# Patient Record
Sex: MALE | Race: WHITE | NOT HISPANIC OR LATINO | Employment: FULL TIME | URBAN - METROPOLITAN AREA
[De-identification: names, ages, dates, MRNs, and addresses within clinical notes are randomized per-mention and may not be internally consistent; named-entity substitution may affect disease eponyms.]

---

## 2017-01-23 RX ORDER — AMLODIPINE BESYLATE 5 MG/1
TABLET ORAL
Qty: 30 TABLET | Refills: 10 | Status: SHIPPED | OUTPATIENT
Start: 2017-01-23 | End: 2017-05-25

## 2017-03-18 RX ORDER — FUROSEMIDE 20 MG/1
TABLET ORAL
Qty: 30 TABLET | Refills: 10 | OUTPATIENT
Start: 2017-03-18

## 2017-04-18 ENCOUNTER — PATIENT MESSAGE (OUTPATIENT)
Dept: NEPHROLOGY | Facility: CLINIC | Age: 62
End: 2017-04-18

## 2017-04-18 DIAGNOSIS — N18.30 CKD (CHRONIC KIDNEY DISEASE) STAGE 3, GFR 30-59 ML/MIN: Primary | ICD-10-CM

## 2017-04-21 ENCOUNTER — PATIENT MESSAGE (OUTPATIENT)
Dept: ADMINISTRATIVE | Facility: OTHER | Age: 62
End: 2017-04-21

## 2017-05-18 ENCOUNTER — LAB VISIT (OUTPATIENT)
Dept: LAB | Facility: HOSPITAL | Age: 62
End: 2017-05-18
Attending: INTERNAL MEDICINE
Payer: COMMERCIAL

## 2017-05-18 DIAGNOSIS — N18.30 CKD (CHRONIC KIDNEY DISEASE) STAGE 3, GFR 30-59 ML/MIN: ICD-10-CM

## 2017-05-18 LAB
ANION GAP SERPL CALC-SCNC: 9 MMOL/L
BASOPHILS # BLD AUTO: 0.06 K/UL
BASOPHILS NFR BLD: 0.7 %
BUN SERPL-MCNC: 40 MG/DL
CALCIUM SERPL-MCNC: 9.1 MG/DL
CHLORIDE SERPL-SCNC: 105 MMOL/L
CO2 SERPL-SCNC: 28 MMOL/L
CREAT SERPL-MCNC: 1.8 MG/DL
DIFFERENTIAL METHOD: ABNORMAL
EOSINOPHIL # BLD AUTO: 0.2 K/UL
EOSINOPHIL NFR BLD: 2.4 %
ERYTHROCYTE [DISTWIDTH] IN BLOOD BY AUTOMATED COUNT: 15 %
EST. GFR  (AFRICAN AMERICAN): 45.3 ML/MIN/1.73 M^2
EST. GFR  (NON AFRICAN AMERICAN): 39.2 ML/MIN/1.73 M^2
GLUCOSE SERPL-MCNC: 79 MG/DL
HCT VFR BLD AUTO: 40.9 %
HGB BLD-MCNC: 13.7 G/DL
LYMPHOCYTES # BLD AUTO: 1.7 K/UL
LYMPHOCYTES NFR BLD: 18.6 %
MCH RBC QN AUTO: 31.1 PG
MCHC RBC AUTO-ENTMCNC: 33.5 %
MCV RBC AUTO: 93 FL
MONOCYTES # BLD AUTO: 0.6 K/UL
MONOCYTES NFR BLD: 6.5 %
NEUTROPHILS # BLD AUTO: 6.6 K/UL
NEUTROPHILS NFR BLD: 71.6 %
PLATELET # BLD AUTO: 161 K/UL
PMV BLD AUTO: 10.2 FL
POTASSIUM SERPL-SCNC: 4.3 MMOL/L
RBC # BLD AUTO: 4.4 M/UL
SODIUM SERPL-SCNC: 142 MMOL/L
WBC # BLD AUTO: 9.18 K/UL

## 2017-05-18 PROCEDURE — 80048 BASIC METABOLIC PNL TOTAL CA: CPT

## 2017-05-18 PROCEDURE — 36415 COLL VENOUS BLD VENIPUNCTURE: CPT | Mod: PO

## 2017-05-18 PROCEDURE — 85025 COMPLETE CBC W/AUTO DIFF WBC: CPT

## 2017-05-25 ENCOUNTER — OFFICE VISIT (OUTPATIENT)
Dept: NEPHROLOGY | Facility: CLINIC | Age: 62
End: 2017-05-25
Payer: COMMERCIAL

## 2017-05-25 VITALS
HEIGHT: 68 IN | BODY MASS INDEX: 28.07 KG/M2 | HEART RATE: 95 BPM | SYSTOLIC BLOOD PRESSURE: 110 MMHG | WEIGHT: 185.19 LBS | DIASTOLIC BLOOD PRESSURE: 70 MMHG

## 2017-05-25 DIAGNOSIS — N13.8 OBSTRUCTIVE NEPHROPATHY: ICD-10-CM

## 2017-05-25 DIAGNOSIS — N18.30 CHRONIC KIDNEY DISEASE, STAGE III (MODERATE): Primary | ICD-10-CM

## 2017-05-25 DIAGNOSIS — I48.0 PAROXYSMAL ATRIAL FIBRILLATION: ICD-10-CM

## 2017-05-25 DIAGNOSIS — I50.33 ACUTE ON CHRONIC DIASTOLIC CONGESTIVE HEART FAILURE: ICD-10-CM

## 2017-05-25 DIAGNOSIS — R60.0 LOCALIZED EDEMA: ICD-10-CM

## 2017-05-25 DIAGNOSIS — I10 ESSENTIAL HYPERTENSION: ICD-10-CM

## 2017-05-25 PROBLEM — I48.91 ATRIAL FIBRILLATION: Status: ACTIVE | Noted: 2017-05-25

## 2017-05-25 PROCEDURE — 99215 OFFICE O/P EST HI 40 MIN: CPT | Mod: S$GLB,,, | Performed by: INTERNAL MEDICINE

## 2017-05-25 PROCEDURE — 99999 PR PBB SHADOW E&M-EST. PATIENT-LVL III: CPT | Mod: PBBFAC,,, | Performed by: INTERNAL MEDICINE

## 2017-05-25 RX ORDER — WARFARIN SODIUM 5 MG/1
5 TABLET ORAL
COMMUNITY

## 2017-05-25 RX ORDER — FUROSEMIDE 40 MG/1
40 TABLET ORAL 2 TIMES DAILY
Qty: 60 TABLET | Refills: 6
Start: 2017-05-25

## 2017-05-25 RX ORDER — ATORVASTATIN CALCIUM 40 MG/1
40 TABLET, FILM COATED ORAL
COMMUNITY

## 2017-05-25 RX ORDER — METOPROLOL SUCCINATE 50 MG/1
50 TABLET, EXTENDED RELEASE ORAL
COMMUNITY

## 2017-05-25 RX ORDER — LISINOPRIL 2.5 MG/1
2.5 TABLET ORAL
COMMUNITY

## 2017-05-25 RX ORDER — POTASSIUM CHLORIDE 20 MEQ/1
20 TABLET, EXTENDED RELEASE ORAL
COMMUNITY

## 2017-05-25 NOTE — PROGRESS NOTES
NEPHROLOGY CLINIC FOLLOWUP NOTE    REASON FOR FOLLOWUP AND CHIEF COMPLAINT:  Chronic kidney disease.    Cardiologist is Dr. Daniel Beauchamp.  His phone # is [phone].  He is from   [facility].    HISTORY OF PRESENT ILLNESS:  Mr. Ravi is a 61-year-old  male who has   followed with us at [facility] for the past several years.  He has a   history of chronic kidney disease and renal insufficiency due to benign   prostatic hypertrophy with prior obstruction.  He previously had bilateral   hydronephrosis.  His renal failure stabilized after treatment of prostate   hypertrophy.  In the past several years, his creatinine has remained stable and   not worsened.  He was last seen by me in August 2015.  He has no acute   complaints today.  No chest pain.  No shortness of breath.  No difficulty   urinating.  No back pain.  No abdominal pain.  No fever.  Labs and meds were   reviewed with him.    Since his last visit, he has had some cardiac issues, which I have reviewed   today.  He is now status post pacemaker and ICD placement for development of   atrial fibrillation.  He is also on Coumadin and his medications have changed.    I have noted that he is on Lasix 80 mg twice a day after a recent episode of   acute CHF exacerbation.  He says he is doing well today.  He says his leg   swelling has markedly improved.  He is not short of breath.  His serum   creatinine has slightly worsened from his baseline.    PAST MEDICAL HISTORY:  1.  CKD stage III, estimated GFR about 50 mL per minute.  2.  Hypertension.  3.  UTI.  4.  Prostatitis.  5.  Benign prostatic hypertrophy with obstruction and hydronephrosis bilaterally   status post laser treatment.  6.  Multiple DVTs.  7.  History of cholecystectomy.  8.  Coronary artery disease.  9.  Mitral regurgitation  10.  Atrial fibrillation.  11.  Congestive heart failure status post pacemaker and ICD placement.    CARDIOLOGIST:  Daniel Beauchamp from  [facility].    PAST SURGICAL HISTORY:  As above.    FAMILY MEDICAL HISTORY:  Unchanged.    ALLERGIES:  Reviewed.  No known drug allergies.    MEDICATIONS:  Reviewed.  Lisinopril 2.5 mg p.o. daily, Lasix 80 mg b.i.d.,   Lipitor 40 mg daily, aspirin 325 mg, Toprol-XL 50 mg daily, potassium   replacement and Coumadin 5 mg.    REVIEW OF SYSTEMS:  No recent hospitalizations.  GENERAL:  Negative.  HEAD, EYES, EARS, NOSE, AND THROAT:  Negative.  CARDIAC:  Negative.  PULMONARY:  Negative.  GASTROINTESTINAL:  Negative.  GENITOURINARY:  Negative.  PSYCHOLOGICAL:  Negative.  NEUROLOGICAL:  Negative.  ENDOCRINE:  Negative.  HEMATOLOGIC AND ONCOLOGIC:  Negative.  INFECTIOUS DISEASE:  Negative.  The rest of the review of systems negative.    PHYSICAL EXAMINATION:  VITAL SIGNS:  Blood pressure is 110/70, pulse is 95.  His weight is 185 pounds   compared to 201 pounds about a year ago.  GENERAL:  He is cooperative, pleasant.  Ambulating by himself.  He is in no   acute distress.  Speech and thought process appropriate, normal.  HEENT:  Atraumatic, normocephalic:  Mucous membranes moist.  NECK:  No JVD.  HEART:  Regular rate and rhythm, S1 and S2 audible.  No rubs.  CHEST:  Clear to auscultation.  No rales.  No wheezes.  Breathing symmetric,   unlabored.  ABDOMEN:  Soft, nontender.  EXTREMITIES:  Showed trace to 1+ pitting edema bilaterally.    LABORATORY:  Reviewed.  Creatinine is 1.8, sodium 142, potassium 4.3, chloride   105, bicarbonate 28, glucose 79, calcium 9.5, white count 9.1, hemoglobin 13.7,   platelets 161.  Urinalysis shows negative protein, negative blood.  Urine   protein to creatinine ratio is negative 0.    ASSESSMENT AND PLAN:  This is a 61-year-old man with CKD stage III, who presents   for followup.  The patient usually follows with us once a year, but this visit   was longer than his usual.  He has some cardiac issues since his last visit,   which I will review.  The impression is as  follows:  1.  Renal.  The patient's renal function has remained stable in the past several   years.  He was previously in acute renal failure, but renal dysfunction became   stable after relief of the prostate obstruction.  However, it has left some   residual kidney damage behind.  He has baseline CKD stage III.  2.  Labs reviewed.  Potassium and other electrolytes are stable and normal.    Acid base status is stable.  3.  No urinary issues.  No complaints of dysuria.  No active issues status post   relief of prostate obstruction.  4.  Cardiac:  He has had new cardiac issues including atrial fibrillation and   acute exacerbation of CHF.  He has required placement of a pacemaker and ICD.    He is now on Coumadin and he is on a large dose of diuretics.  I have seen and   evaluated the patient today.  I believe he has diuresed enough and Lasix can now   be tapered down.  I have discussed this with the patient.  His lung exam was   unremarkable.  He has significant peripheral edema; however, this appears to   have improved per history obtained.  Lasix would be reduced.  5.  Hypertension.  Blood pressure is controlled to low.  Medications reviewed.    The patient is not symptomatic, we will monitor.    PLANS AND RECOMMENDATIONS:  1.  Discussed with the patient.  Opportunity for questions and discussion   provided.  2.  Decrease Lasix to 40 mg p.o. b.i.d. from 80 mg b.i.d.  3.  Advised the patient to continue low-salt diet as well as low to moderate   fluid intake.  Do not drink water or other fluids more than 1-2 liters a day.    Total time spent 40 minutes including time needed to review the records as there   was new issues that needed to be addressed.  The patient evaluation,   documentation and face-to-face discussion with the patient.  More than 50% of   the time was spent in counseling and coordination of care.  Level V visit.    Return to see us in about three months for close followup.            / [id number]  stiven(s)        FLAQUITO  dd: 05/25/2017 15:13:11 (CDT)  td: 05/26/2017 13:20:24 (CDT)  Doc ID   #[id number]  Job ID #[id number]    CC: Daniel Beauchamp M.D.    [id number]

## 2017-08-29 ENCOUNTER — LAB VISIT (OUTPATIENT)
Dept: LAB | Facility: HOSPITAL | Age: 62
End: 2017-08-29
Attending: INTERNAL MEDICINE
Payer: COMMERCIAL

## 2017-08-29 DIAGNOSIS — N18.30 CHRONIC KIDNEY DISEASE, STAGE III (MODERATE): ICD-10-CM

## 2017-08-29 LAB
ANION GAP SERPL CALC-SCNC: 14 MMOL/L
BUN SERPL-MCNC: 33 MG/DL
CALCIUM SERPL-MCNC: 9.2 MG/DL
CHLORIDE SERPL-SCNC: 103 MMOL/L
CO2 SERPL-SCNC: 24 MMOL/L
CREAT SERPL-MCNC: 1.7 MG/DL
EST. GFR  (AFRICAN AMERICAN): 48.2 ML/MIN/1.73 M^2
EST. GFR  (NON AFRICAN AMERICAN): 41.7 ML/MIN/1.73 M^2
GLUCOSE SERPL-MCNC: 79 MG/DL
POTASSIUM SERPL-SCNC: 4.3 MMOL/L
SODIUM SERPL-SCNC: 141 MMOL/L

## 2017-08-29 PROCEDURE — 80048 BASIC METABOLIC PNL TOTAL CA: CPT

## 2017-08-29 PROCEDURE — 36415 COLL VENOUS BLD VENIPUNCTURE: CPT | Mod: PO

## 2017-09-08 ENCOUNTER — OFFICE VISIT (OUTPATIENT)
Dept: NEPHROLOGY | Facility: CLINIC | Age: 62
End: 2017-09-08
Payer: COMMERCIAL

## 2017-09-08 VITALS
HEIGHT: 68 IN | HEART RATE: 80 BPM | DIASTOLIC BLOOD PRESSURE: 82 MMHG | SYSTOLIC BLOOD PRESSURE: 122 MMHG | BODY MASS INDEX: 29.17 KG/M2 | WEIGHT: 192.44 LBS

## 2017-09-08 DIAGNOSIS — N40.0 BENIGN PROSTATIC HYPERPLASIA WITHOUT LOWER URINARY TRACT SYMPTOMS: ICD-10-CM

## 2017-09-08 DIAGNOSIS — I48.19 PERSISTENT ATRIAL FIBRILLATION: ICD-10-CM

## 2017-09-08 DIAGNOSIS — N18.30 CHRONIC KIDNEY DISEASE, STAGE III (MODERATE): Primary | ICD-10-CM

## 2017-09-08 DIAGNOSIS — I10 ESSENTIAL HYPERTENSION: ICD-10-CM

## 2017-09-08 PROCEDURE — 3079F DIAST BP 80-89 MM HG: CPT | Mod: S$GLB,,, | Performed by: INTERNAL MEDICINE

## 2017-09-08 PROCEDURE — 3008F BODY MASS INDEX DOCD: CPT | Mod: S$GLB,,, | Performed by: INTERNAL MEDICINE

## 2017-09-08 PROCEDURE — 99214 OFFICE O/P EST MOD 30 MIN: CPT | Mod: S$GLB,,, | Performed by: INTERNAL MEDICINE

## 2017-09-08 PROCEDURE — 99999 PR PBB SHADOW E&M-EST. PATIENT-LVL III: CPT | Mod: PBBFAC,,, | Performed by: INTERNAL MEDICINE

## 2017-09-08 PROCEDURE — 3074F SYST BP LT 130 MM HG: CPT | Mod: S$GLB,,, | Performed by: INTERNAL MEDICINE

## 2017-09-08 NOTE — PROGRESS NOTES
NEPHROLOGY CLINIC FOLLOWUP NOTE    CARDIOLOGIST:  Daniel Beauchamp M.D.  Dr. Beauchamp's phone # is [phone] from   [facility].    HISTORY OF PRESENT ILLNESS:  Mr. Ravi is a 62-year-old  male who has   a history of CKD, stage III and hypertension, and normally follows with us once   a year.  He was last seen by us about four months ago.  As previously   documented, he recently had developed some additional cardiac issues related to   atrial fibrillation.  He is status post pacemaker and ICD placement.  He is on   Coumadin and aspirin.  The patient also had acute CHF exacerbation and had been   placed on Lasix, the dose of which we reduced from 80 mg twice a day to 40 mg   twice a day.    He presents for followup.  He tells me that Dr. Beauchamp is planning   cardioversion for atrial fibrillation.  He has no acute complaints today.  No   chest pain, no shortness of breath, no dizziness, no worsening in leg swelling.    He is tolerating and walking well.  He is on the same dose of Lasix that was   mentioned above.  He is watching his diet carefully, he is on low-salt and   moderate fluid intake.    Regarding his renal issues, he developed renal insufficiency due to complication   of benign prostatic hypertrophy with obstruction.  He had bilateral   hydronephrosis.  Once the prostate hypertrophy was addressed surgically, his   renal failure stabilized.  He has had well stable serum creatinine in the past   several years with a baseline of about 1.7.  He has no urinary complaints today.    PAST MEDICAL HISTORY:  1.  CKD stage III, estimated GFR about 50 mL per minute.  2.  Hypertension.  3.  UTI.  4.  Prostatitis.  5.  Benign prostatic hypertrophy with obstruction and hydronephrosis bilaterally   status post laser treatment.  6.  Atrial fibrillation status post pacemaker and ICD placement.  7.  Diastolic congestive heart failure.  8.  History of DVTs.  9.  Cholecystectomy.  10.  Coronary  artery disease.  11.  Mitral jet regurgitation.    PAST SURGICAL HISTORY:  Reviewed as above.    FAMILY HISTORY:  Reviewed and unchanged.    ALLERGIES:  Reviewed.  No known drug allergies.    MEDICATIONS:  Reviewed.  Aspirin 325 mg p.o. daily, Lipitor 40 mg daily, Lasix   is 40 mg b.i.d., lisinopril 2.5 mg daily, Toprol-XL 50 mg daily, potassium   replacement 20 mEq, and Coumadin 5 mg.    REVIEW OF SYSTEMS:  No recent hospitalizations.  GENERAL:  Negative.  HEAD, EYES, EARS, NOSE, THROAT:  Negative.  CARDIAC:  Negative.  PULMONARY:  Negative.  GASTROINTESTINAL:  Negative.  GENITOURINARY:  Negative.  PSYCHOLOGICAL:  Negative.  NEUROLOGICAL:  Negative.  ENDOCRINE:  Negative.  HEMATOLOGIC AND ONCOLOGIC:  Negative.  INFECTIOUS DISEASE:  Negative.  The rest of the review of systems negative.    PHYSICAL EXAMINATION:  VITAL SIGNS:  Blood pressure is 122/82, pulse 80, weight is 192 pounds.  GENERAL:  He is cooperative, pleasant, in no acute distress.  Mucous membranes   moist.  Ambulating by himself.  Speech and thought process appropriate and   normal.  HEART:  Irregularly irregular.  CHEST:  Clear to auscultation.  No rales.  No wheezes.  Breathing symmetric,   unlabored.  ABDOMEN:  Soft, nontender.  EXTREMITIES:  Showed only trace edema bilaterally.    LABORATORY:  Reviewed.  Creatinine is 1.7, sodium 141, potassium 4.3, chloride   103, bicarbonate 24, calcium 9.2.    Labs were further reviewed.  TSH from the [facility] was 1.2 in December 2016.    ASSESSMENT AND PLAN:  This is a 62-year-old man with CKD stage III, who presents   for followup.  The impression is as follows:  1.  Renal.  The patient's renal function is stable.  Creatinine has not changed,   has not worsened.  The patient is doing well from our point of view, potassium   is normal.  Acid base status is stable.  He is doing well from our point of   view.  2.  History of BPH with obstruction, status post laser surgery.  He is doing   well, stable,  obstruction was relieved.  Renal failure is stable.  3.  Cardiac.  He has atrial fibrillation and had acute exacerbation of CHF,   which has now resolved, uncontrolled.  His fluid status is stable.  He is on the   right dose of Lasix.  The patient is applying salt restriction and fluid   moderation to his diet.    Regarding atrial fibrillation and is on Coumadin and aspirin.  He is status post   ICD placement and the patient is following with Dr. Beauchamp, his cardiologist   for possible cardioversion.  The patient was reassured that the procedure is   safe and we will defer further management to his cardiologist.    PLANS AND RECOMMENDATIONS:  As discussed above.  Opportunity for questions and   discussion provided.  No medication changes today.  Continue low-salt diet and   low-to-moderate fluid intake.  Total time spent 25 minutes, more than 50% of the   time was spent on counseling and coordination of care.  Return to see us in one   year or sooner if necessary.      ADRIANA  dd: 09/08/2017 15:47:11 (CDT)  td: 09/09/2017 02:07:04 (CDT)  Doc ID   #[id number]  Job ID #[id number]    CC: Daniel Beauchamp M.D.    [id number]

## 2023-05-17 LAB — HEMOCCULT STL QL IA: NEGATIVE

## 2023-07-21 ENCOUNTER — PATIENT OUTREACH (OUTPATIENT)
Dept: ADMINISTRATIVE | Facility: HOSPITAL | Age: 68
End: 2023-07-21
Payer: COMMERCIAL

## 2023-07-22 ENCOUNTER — PATIENT MESSAGE (OUTPATIENT)
Dept: ADMINISTRATIVE | Facility: HOSPITAL | Age: 68
End: 2023-07-22
Payer: COMMERCIAL

## 2023-07-22 ENCOUNTER — TELEPHONE (OUTPATIENT)
Dept: ADMINISTRATIVE | Facility: HOSPITAL | Age: 68
End: 2023-07-22
Payer: COMMERCIAL

## 2023-08-03 ENCOUNTER — PATIENT OUTREACH (OUTPATIENT)
Dept: ADMINISTRATIVE | Facility: HOSPITAL | Age: 68
End: 2023-08-03
Payer: COMMERCIAL

## 2023-10-13 ENCOUNTER — PATIENT OUTREACH (OUTPATIENT)
Dept: ADMINISTRATIVE | Facility: HOSPITAL | Age: 68
End: 2023-10-13
Payer: COMMERCIAL

## 2024-03-28 ENCOUNTER — LAB VISIT (OUTPATIENT)
Dept: LAB | Facility: HOSPITAL | Age: 69
End: 2024-03-28
Attending: INTERNAL MEDICINE
Payer: MEDICARE

## 2024-03-28 DIAGNOSIS — N18.30 STAGE 3 CHRONIC KIDNEY DISEASE, UNSPECIFIED WHETHER STAGE 3A OR 3B CKD: Primary | ICD-10-CM

## 2024-03-28 DIAGNOSIS — N18.30 STAGE 3 CHRONIC KIDNEY DISEASE, UNSPECIFIED WHETHER STAGE 3A OR 3B CKD: ICD-10-CM

## 2024-03-28 LAB
BASOPHILS # BLD AUTO: 0.09 K/UL (ref 0–0.2)
BASOPHILS NFR BLD: 1 % (ref 0–1.9)
DIFFERENTIAL METHOD BLD: ABNORMAL
EOSINOPHIL # BLD AUTO: 0.2 K/UL (ref 0–0.5)
EOSINOPHIL NFR BLD: 1.9 % (ref 0–8)
ERYTHROCYTE [DISTWIDTH] IN BLOOD BY AUTOMATED COUNT: 13.4 % (ref 11.5–14.5)
HCT VFR BLD AUTO: 43.6 % (ref 40–54)
HGB BLD-MCNC: 15 G/DL (ref 14–18)
IMM GRANULOCYTES # BLD AUTO: 0.03 K/UL (ref 0–0.04)
IMM GRANULOCYTES NFR BLD AUTO: 0.3 % (ref 0–0.5)
LYMPHOCYTES # BLD AUTO: 1.4 K/UL (ref 1–4.8)
LYMPHOCYTES NFR BLD: 14.9 % (ref 18–48)
MCH RBC QN AUTO: 31.9 PG (ref 27–31)
MCHC RBC AUTO-ENTMCNC: 34.4 G/DL (ref 32–36)
MCV RBC AUTO: 93 FL (ref 82–98)
MONOCYTES # BLD AUTO: 0.6 K/UL (ref 0.3–1)
MONOCYTES NFR BLD: 6.7 % (ref 4–15)
NEUTROPHILS # BLD AUTO: 7.1 K/UL (ref 1.8–7.7)
NEUTROPHILS NFR BLD: 75.2 % (ref 38–73)
NRBC BLD-RTO: 0 /100 WBC
PLATELET # BLD AUTO: 195 K/UL (ref 150–450)
PMV BLD AUTO: 10.7 FL (ref 9.2–12.9)
RBC # BLD AUTO: 4.7 M/UL (ref 4.6–6.2)
WBC # BLD AUTO: 9.41 K/UL (ref 3.9–12.7)

## 2024-03-28 PROCEDURE — 80048 BASIC METABOLIC PNL TOTAL CA: CPT | Mod: HCNC | Performed by: INTERNAL MEDICINE

## 2024-03-28 PROCEDURE — 36415 COLL VENOUS BLD VENIPUNCTURE: CPT | Mod: HCNC,PN | Performed by: INTERNAL MEDICINE

## 2024-03-28 PROCEDURE — 85025 COMPLETE CBC W/AUTO DIFF WBC: CPT | Mod: HCNC | Performed by: INTERNAL MEDICINE

## 2024-03-29 LAB
ANION GAP SERPL CALC-SCNC: 7 MMOL/L (ref 8–16)
BUN SERPL-MCNC: 20 MG/DL (ref 8–23)
CALCIUM SERPL-MCNC: 9.8 MG/DL (ref 8.7–10.5)
CHLORIDE SERPL-SCNC: 109 MMOL/L (ref 95–110)
CO2 SERPL-SCNC: 25 MMOL/L (ref 23–29)
CREAT SERPL-MCNC: 1.2 MG/DL (ref 0.5–1.4)
EST. GFR  (NO RACE VARIABLE): >60 ML/MIN/1.73 M^2
GLUCOSE SERPL-MCNC: 101 MG/DL (ref 70–110)
POTASSIUM SERPL-SCNC: 4.3 MMOL/L (ref 3.5–5.1)
SODIUM SERPL-SCNC: 141 MMOL/L (ref 136–145)

## 2024-04-02 ENCOUNTER — OFFICE VISIT (OUTPATIENT)
Dept: NEPHROLOGY | Facility: CLINIC | Age: 69
End: 2024-04-02
Payer: MEDICARE

## 2024-04-02 VITALS
RESPIRATION RATE: 18 BRPM | WEIGHT: 190.25 LBS | BODY MASS INDEX: 28.83 KG/M2 | SYSTOLIC BLOOD PRESSURE: 158 MMHG | DIASTOLIC BLOOD PRESSURE: 78 MMHG | HEIGHT: 68 IN | HEART RATE: 63 BPM

## 2024-04-02 DIAGNOSIS — I50.33 ACUTE ON CHRONIC DIASTOLIC CONGESTIVE HEART FAILURE: ICD-10-CM

## 2024-04-02 DIAGNOSIS — I10 PRIMARY HYPERTENSION: Primary | ICD-10-CM

## 2024-04-02 PROCEDURE — 99999 PR PBB SHADOW E&M-EST. PATIENT-LVL III: CPT | Mod: PBBFAC,HCNC,, | Performed by: INTERNAL MEDICINE

## 2024-04-02 PROCEDURE — 3078F DIAST BP <80 MM HG: CPT | Mod: HCNC,CPTII,S$GLB, | Performed by: INTERNAL MEDICINE

## 2024-04-02 PROCEDURE — 99205 OFFICE O/P NEW HI 60 MIN: CPT | Mod: HCNC,S$GLB,, | Performed by: INTERNAL MEDICINE

## 2024-04-02 PROCEDURE — 1159F MED LIST DOCD IN RCRD: CPT | Mod: HCNC,CPTII,S$GLB, | Performed by: INTERNAL MEDICINE

## 2024-04-02 PROCEDURE — 1126F AMNT PAIN NOTED NONE PRSNT: CPT | Mod: HCNC,CPTII,S$GLB, | Performed by: INTERNAL MEDICINE

## 2024-04-02 PROCEDURE — 4010F ACE/ARB THERAPY RXD/TAKEN: CPT | Mod: HCNC,CPTII,S$GLB, | Performed by: INTERNAL MEDICINE

## 2024-04-02 PROCEDURE — 3008F BODY MASS INDEX DOCD: CPT | Mod: HCNC,CPTII,S$GLB, | Performed by: INTERNAL MEDICINE

## 2024-04-02 PROCEDURE — 1101F PT FALLS ASSESS-DOCD LE1/YR: CPT | Mod: HCNC,CPTII,S$GLB, | Performed by: INTERNAL MEDICINE

## 2024-04-02 PROCEDURE — 3288F FALL RISK ASSESSMENT DOCD: CPT | Mod: HCNC,CPTII,S$GLB, | Performed by: INTERNAL MEDICINE

## 2024-04-02 PROCEDURE — 3066F NEPHROPATHY DOC TX: CPT | Mod: HCNC,CPTII,S$GLB, | Performed by: INTERNAL MEDICINE

## 2024-04-02 PROCEDURE — 3077F SYST BP >= 140 MM HG: CPT | Mod: HCNC,CPTII,S$GLB, | Performed by: INTERNAL MEDICINE

## 2024-04-02 RX ORDER — FUROSEMIDE 20 MG/1
20 TABLET ORAL 2 TIMES DAILY
Qty: 180 TABLET | Refills: 3
Start: 2024-04-02

## 2024-04-02 RX ORDER — GLUCOSAMINE/D3/BOSWELLIA SERRA 1500MG-400
TABLET ORAL 2 TIMES DAILY
COMMUNITY

## 2024-04-02 RX ORDER — AMLODIPINE BESYLATE 5 MG/1
5 TABLET ORAL DAILY
Qty: 90 TABLET | Refills: 3 | Status: SHIPPED | OUTPATIENT
Start: 2024-04-02 | End: 2025-04-02

## 2024-04-02 NOTE — PROGRESS NOTES
NEPHROLOGY CLINIC CONSULT NOTE:  Date of clinic visit: 4/2/24  Reason for consult: lost to f/u. Past kidney failure     CARDIOLOGIST: Dr. Chua     HISTORY OF PRESENT ILLNESS:  Mr. Ravi is a 68-year-old male who had in the past renal failure due to obstructive nephropathy due to BPH.   Post surgical correction of BPH, s Cr improved from 2.8 to 1.7.  S Cr stayed stable for many years. Pt last saw us in 2017. Pt was lost to f/u and he self-referred himself to us.    He feels well, no new issues, no discomfort.     PAST MEDICAL HISTORY:  1.  CKD stage III, estimated GFR about 50 mL per minute.  2.  Hypertension.  3.  UTI.  4.  Prostatitis.  5.  Benign prostatic hypertrophy with obstruction and hydronephrosis bilaterally status post laser treatment.  6.  Atrial fibrillation status post pacemaker and ICD placement. S/p AVN ablation  7.  Systolic and diastolic congestive heart failure.  8.  History of DVTs.  9.  Cholecystectomy.  10.  Coronary artery disease.  11.  Mitral valve regurgitation.     PAST SURGICAL HISTORY:  Reviewed as above.     FAMILY HISTORY:  Reviewed and unchanged.     ALLERGIES:  Reviewed.  No known drug allergies.     MEDICATIONS:  Reviewed.      Current Outpatient Medications:     apixaban (ELIQUIS) 5 mg Tab, Take 5 mg by mouth 2 (two) times daily., Disp: , Rfl:     aspirin (ECOTRIN) 325 MG EC tablet, Take 81 mg by mouth once daily. , Disp: , Rfl:     glucosamine-D3-Boswellia serr 1,500-400-100 mg-unit-mg Tab, Take by mouth 2 (two) times a day., Disp: , Rfl:     metoprolol succinate (TOPROL-XL) 50 MG 24 hr tablet, Take 50 mg by mouth., Disp: , Rfl:     potassium chloride SA (K-DUR,KLOR-CON) 20 MEQ tablet, Take 20 mEq by mouth., Disp: , Rfl:     sacubitriL-valsartan (ENTRESTO) 49-51 mg per tablet, Take 1 tablet by mouth 2 (two) times daily., Disp: , Rfl:     atorvastatin (LIPITOR) 40 MG tablet, Take 40 mg by mouth., Disp: , Rfl:     furosemide (LASIX) 40 MG tablet, Take 1 tablet (20 mg total) by  "mouth 2 (two) times daily., Disp: 180 tablet, Rfl: 3    mv-min-folic-K1-lycopen-lutein 702--300 mcg Tab, Take by mouth., Disp: , Rfl:      REVIEW OF SYSTEMS:  No recent hospitalizations.  GENERAL:  Negative.  HEAD, EYES, EARS, NOSE, THROAT:  Negative.  CARDIAC:  Negative.  PULMONARY:  Negative.  GASTROINTESTINAL:  Negative.  GENITOURINARY:  Negative.  PSYCHOLOGICAL:  Negative.  NEUROLOGICAL:  Negative.  ENDOCRINE:  Negative.  HEMATOLOGIC AND ONCOLOGIC:  Negative.  INFECTIOUS DISEASE:  Negative.  The rest of the review of systems negative.     PHYSICAL EXAMINATION:  VITAL SIGNS:  Blood pressure (!) 158/78, pulse 63, resp. rate 18, height 5' 8" (1.727 m), weight 86.3 kg (190 lb 4.1 oz).  Repeat /80  GENERAL:  He is cooperative, pleasant, in no acute distress.    Mucous membranes moist.    Ambulating by himself.    Speech and thought process appropriate and normal.  HEART:  Irregularly irregular.  CHEST:  Clear to auscultation.  No rales.  No wheezes.  Breathing symmetric, unlabored.  ABDOMEN:  Soft, nontender.  EXTREMITIES:  Showed only trace edema bilaterally.     LABORATORY:    BMP  Lab Results   Component Value Date     03/28/2024    K 4.3 03/28/2024     03/28/2024    CO2 25 03/28/2024    BUN 20 03/28/2024    CREATININE 1.2 03/28/2024    CALCIUM 9.8 03/28/2024    ANIONGAP 7 (L) 03/28/2024    EGFRNORACEVR >60.0 03/28/2024     Lab Results   Component Value Date    WBC 9.41 03/28/2024    HGB 15.0 03/28/2024    HCT 43.6 03/28/2024    MCV 93 03/28/2024     03/28/2024           ASSESSMENT AND PLAN:  This is a 68-year-old man with prior renal failure due to obstructive nephropathy who presents to re-establish renal care after being lost to f/u:    1.  Renal. Pt was last seen in 2017.  Had FILIBERTO (Cr 2.8) due to BPH induced bladder neck obstruction  Cr improved to 1.7 and stayed there for may years after surgical correction of the obstruction  Cr appears now to have much further improved.  Pt " has CKD stage 2  K normal  Na normal  Acid base no issues Ca normal    2.  History of BPH with obstruction, status post laser surgery.    Obstruction was opened    3.  Cardiac. H/o of multiple heart issues: CAD, CHF, AF  S/p AV node ablation  S/p PM and ICD  Hemodynamically stable  On entresto   No significant fluid gain: will lower lasix from 40 to 20 mg po bid    4. HTN: BP not controlled  Meds reviewed  Will add amlodipine 5 mg po qd  Keep salt and fluid intake low      PLANS AND RECOMMENDATIONS:    As discussed above.    Opportunity for questions and discussion provided.   Total time spent 60 minutes including time needed to review the records, the   patient evaluation, documentation, face-to-face discussion with the patient,   more than 50% of the time was spent on coordination of care and counseling.    Level V visit.  RTC 1 year    Dereje Merritt MD

## 2025-01-17 RX ORDER — AMLODIPINE BESYLATE 5 MG/1
5 TABLET ORAL
Qty: 90 TABLET | Refills: 3 | Status: SHIPPED | OUTPATIENT
Start: 2025-01-17

## 2025-02-28 ENCOUNTER — LAB VISIT (OUTPATIENT)
Dept: LAB | Facility: HOSPITAL | Age: 70
End: 2025-02-28
Attending: INTERNAL MEDICINE
Payer: MEDICARE

## 2025-02-28 DIAGNOSIS — I10 PRIMARY HYPERTENSION: ICD-10-CM

## 2025-02-28 LAB
ALBUMIN SERPL BCP-MCNC: 4.1 G/DL (ref 3.5–5.2)
ANION GAP SERPL CALC-SCNC: 12 MMOL/L (ref 8–16)
BUN SERPL-MCNC: 19 MG/DL (ref 8–23)
CALCIUM SERPL-MCNC: 9.4 MG/DL (ref 8.7–10.5)
CHLORIDE SERPL-SCNC: 106 MMOL/L (ref 95–110)
CO2 SERPL-SCNC: 25 MMOL/L (ref 23–29)
CREAT SERPL-MCNC: 1.3 MG/DL (ref 0.5–1.4)
EST. GFR  (NO RACE VARIABLE): 58.7 ML/MIN/1.73 M^2
GLUCOSE SERPL-MCNC: 90 MG/DL (ref 70–110)
PHOSPHATE SERPL-MCNC: 2.9 MG/DL (ref 2.7–4.5)
POTASSIUM SERPL-SCNC: 4.4 MMOL/L (ref 3.5–5.1)
SODIUM SERPL-SCNC: 143 MMOL/L (ref 136–145)

## 2025-02-28 PROCEDURE — 36415 COLL VENOUS BLD VENIPUNCTURE: CPT | Mod: HCNC,PO | Performed by: INTERNAL MEDICINE

## 2025-02-28 PROCEDURE — 80069 RENAL FUNCTION PANEL: CPT | Mod: HCNC | Performed by: INTERNAL MEDICINE

## 2025-03-07 ENCOUNTER — OFFICE VISIT (OUTPATIENT)
Dept: NEPHROLOGY | Facility: CLINIC | Age: 70
End: 2025-03-07
Payer: MEDICARE

## 2025-03-07 VITALS
SYSTOLIC BLOOD PRESSURE: 140 MMHG | DIASTOLIC BLOOD PRESSURE: 70 MMHG | HEART RATE: 65 BPM | WEIGHT: 195.75 LBS | RESPIRATION RATE: 18 BRPM | BODY MASS INDEX: 29.67 KG/M2 | HEIGHT: 68 IN

## 2025-03-07 DIAGNOSIS — N13.8 OBSTRUCTIVE NEPHROPATHY: Primary | ICD-10-CM

## 2025-03-07 PROCEDURE — 99999 PR PBB SHADOW E&M-EST. PATIENT-LVL III: CPT | Mod: PBBFAC,HCNC,, | Performed by: INTERNAL MEDICINE

## 2025-03-07 RX ORDER — ROSUVASTATIN CALCIUM 20 MG/1
20 TABLET, COATED ORAL NIGHTLY
COMMUNITY

## 2025-03-07 RX ORDER — FUROSEMIDE 20 MG/1
20 TABLET ORAL DAILY
Qty: 90 TABLET | Refills: 3
Start: 2025-03-07

## 2025-03-07 NOTE — PROGRESS NOTES
NEPHROLOGY CLINIC CONSULT NOTE:  Date of clinic visit: 3/7/25  Reason for consult: CKD. Past kidney failure     CARDIOLOGIST: Dr. Chua     HISTORY OF PRESENT ILLNESS:  Mr. Ravi is a 69 year-old male with past h/o of renal failure due to obstructive nephropathy due to BPH, who presents for f/u. FILIBERTO resolved after surgical correction of BPH, s Cr improved from 2.8 to 1.7, and the to as low as 1.2. Pt was last seen in renal clinic about 1 years ago. Chart was reviewed. No new events. On f/u today, pt has no new c/o's, no discomfort, feels well, is physically active, walks in the yard. Meds reviewed, noted on lasix bid. Pt denies SOB, no leg swelling,no urinary issues. He feels well, no new issues, no discomfort.     PAST MEDICAL HISTORY:  1.  CKD stage III, estimated GFR about 50 mL per minute.  2.  Hypertension.  3.  UTI.  4.  Prostatitis.  5.  Benign prostatic hypertrophy with obstruction and hydronephrosis bilaterally status post laser treatment.  6.  Atrial fibrillation status post pacemaker and ICD placement. S/p AVN ablation  7.  Systolic and diastolic congestive heart failure.  8.  History of DVTs.  9.  Cholecystectomy.  10.  Coronary artery disease.  11.  Mitral valve regurgitation.     PAST SURGICAL HISTORY:  Reviewed as above.     FAMILY HISTORY:  Reviewed and unchanged.     ALLERGIES:  Reviewed.  No known drug allergies.     MEDICATIONS:  Reviewed.       Current Outpatient Medications   Medication Instructions    amLODIPine (NORVASC) 5 mg, Oral    apixaban (ELIQUIS) 5 mg, 2 times daily    aspirin (ECOTRIN) 81 mg, Daily    atorvastatin (LIPITOR) 40 mg, Oral    furosemide (LASIX) 20 mg, Oral, Daily    glucosamine-D3-Boswellia serr 1,500-400-100 mg-unit-mg Tab 2 times daily    metoprolol succinate (TOPROL-XL) 50 mg    mv-min-folic-K1-lycopen-lutein 083--300 mcg Tab Take by mouth.    potassium chloride SA (K-DUR,KLOR-CON) 20 MEQ tablet 20 mEq    rosuvastatin (CRESTOR) 20 mg, Nightly     "sacubitriL-valsartan (ENTRESTO) 49-51 mg per tablet 1 tablet, 2 times daily        REVIEW OF SYSTEMS:  No recent hospitalizations.  GENERAL:  Negative.  HEAD, EYES, EARS, NOSE, THROAT:  Negative.  CARDIAC:  Negative.  PULMONARY:  Negative.  GASTROINTESTINAL:  Negative.  GENITOURINARY:  Negative.  PSYCHOLOGICAL:  Negative.  NEUROLOGICAL:  Negative.  ENDOCRINE:  Negative.  HEMATOLOGIC AND ONCOLOGIC:  Negative.  INFECTIOUS DISEASE:  Negative.  The rest of the review of systems negative.     PHYSICAL EXAMINATION:  VITAL SIGNS:  Blood pressure (!) 140/70, pulse 65, resp. rate 18, height 5' 8" (1.727 m), weight 88.8 kg (195 lb 12.3 oz).  GENERAL:  He is cooperative, pleasant, in no acute distress.    Ambulating by himself.    Speech and thought process appropriate and normal.  HEART: RRR  CHEST:  Clear to auscultation.  No rales.  No wheezes.  Breathing symmetric, unlabored.  ABDOMEN:  Soft, nontender.  EXTREMITIES:  no edema     LABORATORY:    BMP  Lab Results   Component Value Date     02/28/2025    K 4.4 02/28/2025     02/28/2025    CO2 25 02/28/2025    BUN 19 02/28/2025    CREATININE 1.3 02/28/2025    CALCIUM 9.4 02/28/2025    ANIONGAP 12 02/28/2025    EGFRNORACEVR 58.7 (A) 02/28/2025            ASSESSMENT AND PLAN:  This is a 68-year-old man with prior renal failure due to obstructive nephropathy who presents to re-establish renal care after being lost to f/u:     1.  Renal. S Cr stable. No significant change from last year  Initially had FILIBERTO (Cr 2.8) due to BPH induced bladder neck obstruction  Cr improved to 1.7 and stayed there for may years after surgical correction of the obstruction  Cr remains well improved overall.  The small changes in Cr may be due to the use of lasix, which pt takes twice a day  Pt has CKD stage 2-3  K normal  Na normal  Acid base no issues Ca normal     2.  History of BPH with obstruction, status post laser surgery.    Obstruction was opened     3.  Cardiac. H/o of multiple " heart issues: CAD, CHF, AF  S/p AV node ablation  S/p PM and ICD  Hemodynamically stable  On entresto   No significant fluid gain: will lower lasix from 20 mg po bid to qd (was taking 40 mg bid last year)     4. HTN: BP controlled after adding amlodipine last visit  Meds reviewed  Keep salt and fluid intake low        PLANS AND RECOMMENDATIONS:    As discussed above.    Opportunity for questions and discussion provided.   Total time spent 40 minutes including time needed to review the records, the   patient evaluation, documentation, face-to-face discussion with the patient,   more than 50% of the time was spent on coordination of care and counseling.    Level V visit.  RTC 1 year     Dereje Merritt MD